# Patient Record
Sex: MALE | Race: WHITE | NOT HISPANIC OR LATINO | Employment: OTHER | URBAN - METROPOLITAN AREA
[De-identification: names, ages, dates, MRNs, and addresses within clinical notes are randomized per-mention and may not be internally consistent; named-entity substitution may affect disease eponyms.]

---

## 2023-02-22 ENCOUNTER — OFFICE VISIT (OUTPATIENT)
Dept: AUDIOLOGY | Facility: CLINIC | Age: 78
End: 2023-02-22

## 2023-02-22 DIAGNOSIS — R42 DIZZINESS: Primary | ICD-10-CM

## 2023-02-22 NOTE — PROGRESS NOTES
VESTIBULAR EVALUATION - [place] [id number] AUDIOLOGY    Patient Name: Heriberto Bojorquez   MRN:  [id number]   :  1945   Age: 65 y o  Gender: male   DOS: 2023     HISTORY:     Heriberto Bojorquez, a 65 y o  male, was seen on 2023 at the referral of Dr Sonja Hilton for a VNG study due to complaints of recurrent dizziness  Mr Melanie Briones was unaccompanied to today's visit  Mr Melanie Briones is a new patient to our practice  Today, Mr Melanie Briones reported that onset of symptoms began in late summer/early  of   Mr Melanie Briones stated that he could not recall a specific date of onset or narrative of what he was doing when this began  Episodes were described as intermittent in nature  Mr Melanie Briones stated that he has been attending vestibular therapy, but that he felt he only had improved somewhat after his sessions thus far  Dizziness perception was described as a(n) lightheaded sensation wherein Mr Melanie Briones feels as if his visual field is "swirling " He characterized this further by describing his visual field as a "translucent waterfall" that is difficult to see through  Typical duration of symptoms was noted to last seconds to minutes each before subsiding  Episode frequency occurs on a daily basis, occurring a few times per day  Mr Melanie Briones denied numbness/tingling, changes in hearing sensitivity, light sensitivity (photophobia), nausea/emesis, headaches/migraine history, sensitivity to sounds (phonophobia) and heightened anxiety when she feels dizzy  He noted that he has significant shortness of breath, but attributed this to COPD  Mr Melanie Briones has not fallen previously/lost consciousness previously  Other documented medical problems include:  has no past medical history on file  Mr Melanie Briones noted that he suffered two remote strokes 10+ years ago, has a pituitary mass, chronic kidney disease, HTN, and carotid blockage  Mr Melanie Briones denied administration of restricted medications   Usage of alcohol/nicotine/large changes in daily caffeine intake was denied  General diet/nutrition intake was characterized as good  Mr Jaz Bolden stated that they have fair sleep hygiene, and noted that he'll have several days of restless sleep  Overall stress levels were described as normal     Mr Jaz Boledn has not had his hearing tested previously  Mr Jaz Bolden denied otalgia, otorrhea, aural fullness and tinnitus  Audiogram on 2/15/2023 indicated mild to severe SNHL AU  Tympanogram performed ahead of caloric testing to ensure normal ME function  Resultant tympanograms were Type A (normal) AU  VNG RESULTS:    SACCADES WNL for saccadic velocity, accuracy, and latency in both directions   SMOOTH PURSUIT WNL for pursuit gain and symmetry   OPTOKINETICS WNL for 20 deg/s, Low velocity for 40 deg/s   GAZE WNL; no nystagmus observed in horizontal or vertical planes   SPONTANEOUS WNL; no nystagmus observed   HEADSHAKE Negative for nystagmus post-headshake  No subjective dizziness reported   VBIT SCREENING No nystagmus, confusion, changes in vocal quality, or dyspnea noted  No subjective dizziness reported   FIORELLA-HALLPIKE Negative for nystagmus, bilaterally   POSITIONALS Head Left: 8 deg  left - beating nystagmus seen that did fatigue with fixation  No subjective dizziness reported  Head Center: No nystagmus, No subjective dizziness reported  Head Right: 7 deg  right - beating nystagmus seen that did fatigue with fixation  No subjective dizziness reported  CALORICS Abnormal bilateral bithermal caloric study  • Caloric Weakness: Abnormal (100% in the right ear); no response obtained AD after ruling out technical errors  • Directional Preponderance: WNL- 12% LB stronger  • Fixation Suppression: Normal  • Spontaneous Nystagmus Correction: No correction necessary (no SN recorded)     Session notes: none    VNG IMPRESSIONS:   Abnormal VNG study   No response during right caloric testing and findings of a 100% caloric weakness 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  Low gain on OPKs suggests a possible underlying central vestibular pathology, however, this should be compared with other normal oculomotor findings and may be due to age related changes to the oculomotor system, pharmacological factors, inattention/fatigue, etc  Horizontal nystagmus in positional testing without fixation denotes a non-localizing lesion originating from the peripheral vestibular system of either ear or central vestibular pathways  RECOMMENDATIONS:  1 ) Follow-up with referring provider to review today's results  It is recommended that Mr Burnett's vestibular therapy plan be tailored to today's findings  Balance retraining and developing proper compensatory strategies for when he is symptomatic can be considered  2 ) Fall precautions after vestibular system stimulation were discussed at length with the patient  Though most test effects are expected to subside within minutes, it was recommended that they allot extra time for ambulation, avoid rigorous activities, use handrails when available, and be cautious of poorly lit areas for the rest of the day  3 ) Continue to monitor dizziness symptoms  If symptoms worsen or fail to improve prior to follow-up with their referring provider, urgent medical attention should be considered  4 ) Mr Rocio Otero was reminded to be mindful of general wellness when considering dizziness etiologies, including: eating a healthy/balanced diet, proper hydration, stress reduction, proper sleep hygiene, maintenance of an exercise regimen, etc  They were encouraged to follow-up with their primary care physician for further guidance on these topics  *caloric results attached in media (if completed), as well as any other abnormal findings  *    Today's results were counseled at length with Mr Rocio Otero and all of his questions were addressed  It was a pleasure working with Mr Rocio Otero today   Thank you for referring this patient  Marquita Flores    Audiology Coordinator, Clinical Audiologist    [id number] [address] [id number]